# Patient Record
Sex: MALE
[De-identification: names, ages, dates, MRNs, and addresses within clinical notes are randomized per-mention and may not be internally consistent; named-entity substitution may affect disease eponyms.]

---

## 2018-02-07 ENCOUNTER — HOSPITAL ENCOUNTER (EMERGENCY)
Dept: HOSPITAL 80 - CED | Age: 18
Discharge: HOME | End: 2018-02-07
Payer: MEDICAID

## 2018-02-07 VITALS
HEART RATE: 72 BPM | OXYGEN SATURATION: 95 % | SYSTOLIC BLOOD PRESSURE: 122 MMHG | TEMPERATURE: 98.1 F | RESPIRATION RATE: 14 BRPM | DIASTOLIC BLOOD PRESSURE: 62 MMHG

## 2018-02-07 DIAGNOSIS — H72.92: Primary | ICD-10-CM

## 2018-02-07 NOTE — EDPHY
H & P


Time Seen by Provider: 02/07/18 04:27


HPI/ROS: 


17-year-old male presents complaining of decreased hearing in his left ear and 

bloody discharge. He states he was seen by his primary care physician recently 

for cerumen impaction, they washed his ear out.  Later that day he began having 

pain in his left ear and was seen at [facility] where he was 

diagnosed with a middle ear infection and started on Augmentin, he has taken 2 

doses of Augmentin.


He presents early this morning primarily for complaint of bloody discharge as 

well as decreased hearing from the left ear.


He states he has had prior perforated eardrums secondary to a tubing accident.  

He also believes that he had tubes in his ears when he was younger.











Review of systems


As per HPI


General no fever no chills no weakness


HEENT no eye pain no eye discharge. No eye redness, no sore throat


Positive ear pain, positive decreased hearing positive bloody discharge from 

left ear


Respiratory no cough, no shortness of breath


Cardiac no chest pain, no peripheral edema


GI no abdominal pain, no diarrhea, no constipation, no nausea, no vomiting


  no flank pain, no hematuria, no dysuria


Musculoskeletal no myalgias, no joint pain


Heme  no easy bruising, no easy bleeding


Endo no polyuria, no polydipsia


Skin no rashes, no pruritus


Neuro no syncope, no dizziness, no headaches


Psych is no suicidal ideation, no homicidal ideation








Past Medical/Surgical History: 





Perforated tympanic membrane


Social History: 





No alcohol or drug use


Smoking Status: Unknown if ever smoked


Physical Exam: 


17-year-old male


Alert and oriented nontoxic appearance, no acute distress afebrile


Atraumatic normocephalic


Extraocular muscles intact, anicteric


Nares mild yellowish discharge


Oropharynx mild erythema no tonsillar swelling no exudate no uvular deviation, 

tolerating own secretions


Neck supple no lymphadenopathy


Lungs clear to auscultation bilaterally


Heart regular rate and rhythm


Abdomen normoactive bowel sounds soft nontender


Extremities no cyanosis clubbing or edema


Skin no rash








TMs


Right TM mostly obscured by cerumen, upper aspect partially visualized as normal


Left TM


Small amount of blood in canal, no exudate, no cerumen, tympanic membrane 

appears perforated and/or completely retracted


Currently without purulent discharge, however area visualized appears opaque 

and white


Constitutional: 


 Initial Vital Signs











Temperature (C)  36.7 C   02/07/18 04:39


 


Heart Rate  72   02/07/18 04:39


 


Respiratory Rate  14   02/07/18 04:39


 


Blood Pressure  122/62 H  02/07/18 04:39


 


O2 Sat (%)  95   02/07/18 04:39








 











O2 Delivery Mode               Room Air














Allergies/Adverse Reactions: 


 





No Known Allergies Allergy (Unverified 02/07/18 04:38)


 








Home Medications: 














 Medication  Instructions  Recorded


 


Augmentin 875 MG TAB (*)  02/07/18














Medical Decision Making


ED Course/Re-evaluation: 





Patient seen and evaluated for left ear pain, decreased hearing, bloody 

discharge





Impression


Perforated tympanic membrane left





Plan


Continue Augmentin


Follow-up ENT as soon as possible


Differential Diagnosis: 





Otitis media, otitis externa, tympanic membrane rupture





Departure





- Departure


Disposition: Home, Routine, Self-Care


Clinical Impression: 


 Perforated tympanic membrane





Condition: Good


Instructions:  Ruptured Eardrum (ED)


Referrals: 


Patient,NotPresent [Primary Care Provider] - As per Instructions


Miguel Pollock MD [Medical Doctor] - As per Instructions


Orion Garcia PA [Physician Assistant] - As per Instructions


Stand Alone Forms:  Work Excuse